# Patient Record
Sex: FEMALE | Race: WHITE
[De-identification: names, ages, dates, MRNs, and addresses within clinical notes are randomized per-mention and may not be internally consistent; named-entity substitution may affect disease eponyms.]

---

## 2018-12-29 ENCOUNTER — HOSPITAL ENCOUNTER (INPATIENT)
Dept: HOSPITAL 14 - H.ER | Age: 63
LOS: 3 days | Discharge: HOME | DRG: 720 | End: 2019-01-01
Attending: GENERAL ACUTE CARE HOSPITAL | Admitting: GENERAL ACUTE CARE HOSPITAL
Payer: MEDICAID

## 2018-12-29 VITALS — BODY MASS INDEX: 26.4 KG/M2

## 2018-12-29 DIAGNOSIS — K42.9: ICD-10-CM

## 2018-12-29 DIAGNOSIS — K52.9: ICD-10-CM

## 2018-12-29 DIAGNOSIS — N28.89: ICD-10-CM

## 2018-12-29 DIAGNOSIS — A41.9: Primary | ICD-10-CM

## 2018-12-29 DIAGNOSIS — K57.30: ICD-10-CM

## 2018-12-29 DIAGNOSIS — Z98.84: ICD-10-CM

## 2018-12-29 DIAGNOSIS — Z87.442: ICD-10-CM

## 2018-12-29 DIAGNOSIS — E11.22: ICD-10-CM

## 2018-12-29 DIAGNOSIS — I12.9: ICD-10-CM

## 2018-12-29 DIAGNOSIS — N13.6: ICD-10-CM

## 2018-12-29 DIAGNOSIS — N18.3: ICD-10-CM

## 2018-12-29 DIAGNOSIS — D50.8: ICD-10-CM

## 2018-12-29 DIAGNOSIS — N28.1: ICD-10-CM

## 2018-12-29 DIAGNOSIS — Z90.49: ICD-10-CM

## 2018-12-29 LAB
ALBUMIN SERPL-MCNC: 4.6 G/DL (ref 3.5–5)
ALBUMIN/GLOB SERPL: 1.1 {RATIO} (ref 1–2.1)
ALT SERPL-CCNC: 14 U/L (ref 9–52)
APTT BLD: 36.3 SECONDS (ref 25.6–37.1)
AST SERPL-CCNC: 21 U/L (ref 14–36)
BACTERIA #/AREA URNS HPF: (no result) /[HPF]
BASE EXCESS BLDV CALC-SCNC: -1.4 MMOL/L (ref 0–2)
BASE EXCESS BLDV CALC-SCNC: -3.9 MMOL/L (ref 0–2)
BASOPHILS # BLD AUTO: 0.1 K/UL (ref 0–0.2)
BASOPHILS NFR BLD: 0.4 % (ref 0–2)
BILIRUB UR-MCNC: NEGATIVE MG/DL
BUN SERPL-MCNC: 16 MG/DL (ref 7–17)
CALCIUM SERPL-MCNC: 10.3 MG/DL (ref 8.4–10.2)
COLOR UR: YELLOW
EOSINOPHIL # BLD AUTO: 0.1 K/UL (ref 0–0.7)
EOSINOPHIL NFR BLD: 0.4 % (ref 0–4)
ERYTHROCYTE [DISTWIDTH] IN BLOOD BY AUTOMATED COUNT: 19.2 % (ref 11.5–14.5)
GFR NON-AFRICAN AMERICAN: > 60
GLUCOSE UR STRIP-MCNC: (no result) MG/DL
HGB BLD-MCNC: 10 G/DL (ref 12–16)
INR PPP: 1
LEUKOCYTE ESTERASE UR-ACNC: (no result) LEU/UL
LIPASE SERPL-CCNC: 100 U/L (ref 23–300)
LYMPHOCYTES # BLD AUTO: 2.2 K/UL (ref 1–4.3)
LYMPHOCYTES NFR BLD AUTO: 14.2 % (ref 20–40)
MCH RBC QN AUTO: 21.9 PG (ref 27–31)
MCHC RBC AUTO-ENTMCNC: 30.4 G/DL (ref 33–37)
MCV RBC AUTO: 72 FL (ref 81–99)
MONOCYTES # BLD: 0.7 K/UL (ref 0–0.8)
MONOCYTES NFR BLD: 4.5 % (ref 0–10)
NEUTROPHILS # BLD: 12.4 K/UL (ref 1.8–7)
NEUTROPHILS NFR BLD AUTO: 80.5 % (ref 50–75)
NRBC BLD AUTO-RTO: 0 % (ref 0–0)
PCO2 BLDV: 28 MMHG (ref 40–60)
PCO2 BLDV: 43 MMHG (ref 40–60)
PH BLDV: 7.32 [PH] (ref 7.32–7.43)
PH BLDV: 7.48 [PH] (ref 7.32–7.43)
PH UR STRIP: 7 [PH] (ref 5–8)
PLATELET # BLD: 661 K/UL (ref 130–400)
PMV BLD AUTO: 8.1 FL (ref 7.2–11.7)
PROT UR STRIP-MCNC: NEGATIVE MG/DL
PROTHROMBIN TIME: 11.4 SECONDS (ref 9.8–13.1)
RBC # BLD AUTO: 4.55 MIL/UL (ref 3.8–5.2)
RBC # UR STRIP: NEGATIVE /UL
SP GR UR STRIP: 1.01 (ref 1–1.03)
SQUAMOUS EPITHIAL: 1 /HPF (ref 0–5)
URINE CLARITY: CLEAR
UROBILINOGEN UR-MCNC: (no result) MG/DL (ref 0.2–1)
VENOUS BLOOD FIO2: 21 %
VENOUS BLOOD FIO2: 21 %
VENOUS BLOOD GAS PO2: 30 MM/HG (ref 30–55)
VENOUS BLOOD GAS PO2: 30 MM/HG (ref 30–55)
WBC # BLD AUTO: 15.4 K/UL (ref 4.8–10.8)

## 2018-12-29 NOTE — CT
Date of service: 



12/29/2018



PROCEDURE:  CT Abdomen and Pelvis with Oral contrast.



HISTORY:

Left abdominal pain vomit, hx gastric bypass



COMPARISON:

None.



TECHNIQUE:

Contiguous axial images of the abdomen and pelvis. Oral contrast was 

administered. No IV contrast given. Coronal and Sagittal reformats 

generated. 



Radiation dose:



Total exam DLP = 565.19 mGy-cm.



This CT exam was performed using one or more of the following dose 

reduction techniques: Automated exposure control, adjustment of the 

mA and/or kV according to patient size, and/or use of iterative 

reconstruction technique.



FINDINGS:



LOWER THORAX:

Heart appears mildly enlarged.  No significant pericardial effusion. 



There is a small hiatal hernia. 



Mild passive atelectasis both posterior lower lung fields.  Small 

localized area of pleural thickening right posterior lower lung field 

may represent postinflammatory sequela. 



LIVER:

Liver is mildly enlarged measuring nearly 19 cm in CC dimension.  

These fatty hepatic infiltration.  No obvious hepatic mass or 

collection.  Small calcification inferior aspect right lobe liver. 



GALLBLADDER AND BILE DUCTS:

Gallbladder is physiologically distended.  No evidence of 

intraluminal gallbladder calculi. 



PANCREAS:

Unremarkable. No mass. No ductal dilatation.



SPLEEN:

Unremarkable. No splenomegaly. 



ADRENALS:

Slight nodular appearing adrenal glands. 



KIDNEYS AND URETERS:

There is an approximately 5.2 mm obstructing calculus in the left 

proximal/mid ureter with left-sided hydronephrosis and 

infiltration/fluid within the perinephric fat of. 



Complex appearing 2.7 x 2.5 cm partially exophytic cyst seen arising 

from the anterior cortex mid/lower pole left kidney..  There is a 

small approximately 4.6 mm low-attenuation focus anteromedial cortex 

lower pole right left kidney that exhibits Hounsfield units in the 

upper 30s that may represent an volume averaging of a hyperdense 

cyst..  A tiny focus upper pole anterior cortex right kidney.  

Follow-up of pre and post-contrast MRI of kidneys recommended for 

further evaluation of these lesions. 



BLADDER:

Urinary bladder is physiologically distended.  No evidence of 

intraluminal urinary bladder calculi..



REPRODUCTIVE:

Unremarkable. 



APPENDIX:

Small radiopaque density seen along the inferomedial aspect of the 

cecum possibly representing changes of prior appendectomy however 

clinical correlation recommended. 



BOWEL:

Evaluation of the bowel is somewhat limited due to incomplete 

opacification.  Postoperative gastric bypass changes.  There is mild 

dilatation of the proximal small bowel however no definitive evidence 

to suggest acute mechanical small bowel obstruction.  Findings could 

represent mild ileus of.  The distal small bowel loops are not 

significantly distended however fluid is present within several of 

these loops; rule out diarrheal illness.  There is mild wall 

thickening of the distal aspect of the transverse colon descending 

and sigmoid colon.  Findings in part may be secondary to incomplete 

distension peristalsis and unopacified stool however mild colitis 

must be considered as well.  Six few scattered colonic diverticula 

seen along the descending and sigmoid colon. 



PERITONEUM:

Unremarkable. No fluid collection. No free air.  Small fat containing 

umbilical hernia. 



LYMPH NODES:

Unremarkable. No enlarged lymph nodes. 



VASCULATURE:

Unremarkable. No aortic aneurysm. Mild aortic atherosclerotic 

calcification or mural plaque present.



BONES:

There are fusion changes of the T12 and L1 segments of.  Mild 

multilevel degenerative spondylosis of the lower thoracic and lumbar 

spine.  There are no acute compression fractures nor retropulsed 

fragments. 



OTHER FINDINGS:

Calcified granulomata both buttocks are present. 



IMPRESSION:

5.2 mm obstructing calculus proximal/mid left ureter with mild 

left-sided hydronephrosis.  Perinephric infiltration and fluid 

present.  Complex appearing partially exophytic left renal cyst.  

Smaller sub cm low-attenuation lesion also present left kidney.  

Follow-up of pre and post-contrast MRI of the kidneys recommended to 

exclude underlying neoplastic process 



Findings discussed with Dr. Maria



Gastric bypass changes. 



Mild dilatation of the proximal small bowel possibly representing 

ileus.  Follow-up plain film radiograph of the abdomen could be 

performed to assess for passage of contrast material into the colon. 



Mild wall thickening of the distal transverse, descending 6 and 

sigmoid colon; rule out mild colitis.  Few scattered colonic 

diverticula 0. 



Fatty infiltration.  Small calcification right lobe liver.  Nodular 

appearing bilateral adrenal glands.

## 2018-12-29 NOTE — ED PDOC
HPI: Abdomen


Time Seen by Provider: 18 10:11


Chief Complaint (Nursing): Abdominal Pain


Chief Complaint (Provider): abdominal pain nausea


History Per: Patient,  (Arnulfo from [facility])


History/Exam Limitations: no limitations


Location Of Pain/Discomfort: LUQ, LLQ


Quality Of Discomfort: Sharp


Associated Symptoms: Nausea, Vomiting (x1), Diarrhea, Loss Of Appetite.  denies:

Back Pain, Chest Pain, Urinary Symptoms


Exacerbating Factors: None


Alleviating Factors: None


Last Bowel Movement: Today


Additional Complaint(s): 





62yo female hx gastric bypass many years ago, presents c/o left sided abdominal 

pain (upper and lower) started last night around 7pm, progressed to nausea and 

one episode vomiting with diarrhea this morning. Pain sharp, no prior history of

same, denies fever, urinary symptoms, hematuria, cough or chest pain.





Past Medical History


Reviewed: Historical Data, Nursing Documentation, Vital Signs


Vital Signs: 





                                Last Vital Signs











Temp  97.9 F   18 10:03


 


Pulse  61   18 10:03


 


Resp  16   18 10:03


 


BP  158/87 H  18 10:03


 


Pulse Ox  100   18 10:03














- Medical History


PMH: 


   Denies: Diabetes (Hx of Dm, resolved s/p Gastric Bypass ), Chronic Kidney

Disease





- Surgical History


Surgical History: Cholecystectomy, 


Other surgeries: gastric bypass





- Family History


Family History: States: Unknown Family Hx





- Living Arrangements


Living Arrangements: With Family





- Social History


Current smoker - smoking cessation education provided: No





- Immunization History


Hx Influenza Vaccination: No





- Home Medications


Home Medications: 


                                Ambulatory Orders











 Medication  Instructions  Recorded


 


Nitrofurantoin Macrocrystals 100 mg PO Q12H #14 cap 10/17/16





[Macrobid]  














- Allergies


Allergies/Adverse Reactions: 


                                    Allergies











Allergy/AdvReac Type Severity Reaction Status Date / Time


 


No Known Allergies Allergy   Verified 10/13/16 09:57














Review of Systems


Constitutional: Negative for: Fever, Chills, Weight loss


Eyes: Negative for: Vision Change


ENT: Negative for: Nose Discharge


Cardiovascular: Negative for: Chest Pain, Palpitations


Respiratory: Negative for: Cough, Shortness of Breath


Gastrointestinal: Positive for: Nausea, Vomiting, Abdominal Pain, Diarrhea.  

Negative for: Constipation, Melena, Hematochezia, Hematemesis


Genitourinary Female: Negative for: Dysuria


Musculoskeletal: Negative for: Neck Pain, Back Pain


Skin: Negative for: Rash, Lesions, Jaundice


Neurological: Positive for: Headache.  Negative for: Weakness, Numbness, 

Dizziness





Physical Exam





- Reviewed


Nursing Documentation Reviewed: Yes


Vital Signs Reviewed: Yes





- Physical Exam


Appears: Positive for: Well, Non-toxic, No Acute Distress


Head Exam: Positive for: ATRAUMATIC, NORMAL INSPECTION, NORMOCEPHALIC


Skin: Positive for: Normal Color, Warm, DRY


Eye Exam: Positive for: EOMI, Normal appearance, PERRL


ENT: Positive for: Normal ENT Inspection


Neck: Positive for: Normal, Painless ROM


Cardiovascular/Chest: Positive for: Regular Rate, Rhythm


Respiratory: Positive for: CNT, Normal Breath Sounds


Gastrointestinal/Abdominal: Positive for: Soft, Tenderness (LUQ and LLQ)


Back: Positive for: Normal Inspection


Extremity: Positive for: Normal ROM


Neurologic/Psych: Positive for: Alert, Oriented





- Laboratory Results


Result Diagrams: 


                                 18 10:45





                                 18 10:45





- ECG


ECG: Positive for: Interpreted By Me


ECG Rhythm: Positive for: Sinus Bradycardia.  Negative for: ST/T Changes


Rate: 58


O2 Sat by Pulse Oximetry: 100


Pulse Ox Interpretation: Normal





- Radiology


X-Ray: Interpreted by Me


X-Ray Interpretation: Other (R elev hemidiaphragm; no infiltrate, no free air)





Medical Decision Making


Medical Decision Making: 





workup for acute abdominal pain with vomiting and diarrhea in setting of prior 

gastric bypass initiated


pain medicine, antiemetic and IVF ordered





labs reviewed revealing elevated WBC and lactate 2.6


Crt normal


UA neg for blood or leuk esterase





repeat 3hr lactate improving











Accession No. : [id number]


Patient Name / ID : TARA MURRAY  / [id number]


Exam Date : 2018 12:01:00 ( Approved )


Study Comment : 


Sex / Age : F  / 63Y





Creator : David Solorio MD


Dictator : David Solorio MD


 : 


Approver : David Solorio MD


Approver2 : 





Report Date : 2018 14:04:34


My Comment : 


***************************************************************

********************





Date of service: 





2018





PROCEDURE:  CT Abdomen and Pelvis with Oral contrast.





HISTORY:


Left abdominal pain vomit, hx gastric bypass





COMPARISON:


None.





TECHNIQUE:


Contiguous axial images of the abdomen and pelvis. Oral contrast was 

administered. No IV contrast given. Coronal and Sagittal reformats generated. 





Radiation dose:





Total exam DLP = 565.19 mGy-cm.





This CT exam was performed using one or more of the following dose reduction 

techniques: Automated exposure control, adjustment of the mA and/or kV according

 to patient size, and/or use of iterative reconstruction technique.





FINDINGS:





LOWER THORAX:


Heart appears mildly enlarged.  No significant pericardial effusion. 





There is a small hiatal hernia. 





Mild passive atelectasis both posterior lower lung fields.  Small localized area

 of pleural thickening right posterior lower lung field may represent 

postinflammatory sequela. 





LIVER:


Liver is mildly enlarged measuring nearly 19 cm in CC dimension.  These fatty 

hepatic infiltration.  No obvious hepatic mass or collection.  Small 

calcification inferior aspect right lobe liver. 





GALLBLADDER AND BILE DUCTS:


Gallbladder is physiologically distended.  No evidence of intraluminal 

gallbladder calculi. 





PANCREAS:


Unremarkable. No mass. No ductal dilatation.





SPLEEN:


Unremarkable. No splenomegaly. 





ADRENALS:


Slight nodular appearing adrenal glands. 





KIDNEYS AND URETERS:


There is an approximately 5.2 mm obstructing calculus in the left proximal/mid 

ureter with left-sided hydronephrosis and infiltration/fluid within the 

perinephric fat of. 





Complex appearing 2.7 x 2.5 cm partially exophytic cyst seen arising from the 

anterior cortex mid/lower pole left kidney..  There is a small approximately 4.6

 mm low-attenuation focus anteromedial cortex lower pole right left kidney that 

exhibits Hounsfield units in the upper 30s that may represent an volume 

averaging of a hyperdense cyst..  A tiny focus upper pole anterior cortex right 

kidney.  Follow-up of pre and post-contrast MRI of kidneys recommended for 

further evaluation of these lesions. 





BLADDER:


Urinary bladder is physiologically distended.  No evidence of intraluminal 

urinary bladder calculi..





REPRODUCTIVE:


Unremarkable. 





APPENDIX:


Small radiopaque density seen along the inferomedial aspect of the cecum 

possibly representing changes of prior appendectomy however clinical correlation

 recommended. 





BOWEL:


Evaluation of the bowel is somewhat limited due to incomplete opacification.  

Postoperative gastric bypass changes.  There is mild dilatation of the proximal 

small bowel however no definitive evidence to suggest acute mechanical small 

bowel obstruction.  Findings could represent mild ileus of.  The distal small 

bowel loops are not significantly distended however fluid is present within 

several of these loops; rule out diarrheal illness.  There is mild wall thick

ening of the distal aspect of the transverse colon descending and sigmoid colon.

  Findings in part may be secondary to incomplete distension peristalsis and 

unopacified stool however mild colitis must be considered as well.  Six few 

scattered colonic diverticula seen along the descending and sigmoid colon. 





PERITONEUM:


Unremarkable. No fluid collection. No free air.  Small fat containing umbilical 

hernia. 





LYMPH NODES:


Unremarkable. No enlarged lymph nodes. 





VASCULATURE:


Unremarkable. No aortic aneurysm. Mild aortic atherosclerotic calcification or 

mural plaque present.





BONES:


There are fusion changes of the T12 and L1 segments of.  Mild multilevel 

degenerative spondylosis of the lower thoracic and lumbar spine.  There are no 

acute compression fractures nor retropulsed fragments. 





OTHER FINDINGS:


Calcified granulomata both buttocks are present. 





IMPRESSION:


5.2 mm obstructing calculus proximal/mid left ureter with mild left-sided 

hydronephrosis.  Perinephric infiltration and fluid present.  Complex appearing 

partially exophytic left renal cyst.  Smaller sub cm low-attenuation lesion also

 present left kidney.  Follow-up of pre and post-contrast MRI of the kidneys 

recommended to exclude underlying neoplastic process 





Findings discussed with Dr. Maria





Gastric bypass changes. 





Mild dilatation of the proximal small bowel possibly representing ileus.  

Follow-up plain film radiograph of the abdomen could be performed to assess for 

passage of contrast material into the colon. 





Mild wall thickening of the distal transverse, descending 6 and sigmoid colon; 

rule out mild colitis.  Few scattered colonic diverticula 0. 





Fatty infiltration.  Small calcification right lobe liver.  Nodular appearing 

bilateral adrenal glands.








-------------------------------------





additional analgesia required. 


flomax ordered





rocephin ordered after blood cultures obtained





Dr Larose urology on call contacted and aware 220pm





Dr Tran hospitalist contacted 230p care transferred

















Disposition





- Clinical Impression


Clinical Impression: 


 Severe sepsis, Nephrolithiasis, Renal mass








- Patient ED Disposition


Is Patient to be Admitted: Yes


Counseled Patient/Family Regarding: Studies Performed, Diagnosis, Need For 

Followup





- Disposition


Disposition Time: 13:30


Condition: STABLE

## 2018-12-29 NOTE — CP.PCM.HP
<Jose A Aldrich - Last Filed: 12/29/18 16:51>





History of Present Illness





- History of Present Illness


History of Present Illness: 





62 y/o F presented to ED complaining of sudden severe abdominal pain that began 

this morning. Pain is described as sharp/dull, from L flank area radiates to sup

rapubic area, constant, moderately improved with administered medications in ER.

Pt reports associated chills, 2 episodes of non-bloody non-mucous diarrhea since

this morning. No recent trauma. No recent travel. Pt denies sweating, Hx of 

nephrolithiasis, dysuria, urinary frequency, hematuria, rash, vaginal pruritus, 

discharge, nausea, vomiting or constipation. 





NKDA


Meds: none





PMHx: DM2 but controlled after gastric bypass surgery


PSHx: Gastric bypass surgery, uterine fibroid removal, 2x C-Sections, 

Appendectomy.  


FHx: NC


SHx: Never smoker, no alcohol and no rec drugs. 





At ED: 


--Vitals signs stable. No fever.


--CBC showed WBC of 15.4, Hgb 10, Plt 661K


--CMP was unremarkable; Lipase WNL; troponin negative; U/A unremarkable. 


--CT Abdomen/pelvis: 5.2mm obstructing calculus proximal/mid L ureter with mild 

L hydronephrosis. 


--1x dose of Ceftriaxone and pain management administered.








Present on Admission





- Present on Admission


Any Indicators Present on Admission: No





Review of Systems





- Constitutional


Constitutional: Chills.  absent: Fever, Weight Loss





- EENT


Nose/Mouth/Throat: absent: Dysphagia, Mouth Pain, Sore Throat, Facial Pain, Neck

Mass





- Cardiovascular


Cardiovascular: absent: Chest Pain, Chest Pain at Rest, Dyspnea, Edema





- Respiratory


Respiratory: absent: Cough, Dyspnea, Hemoptysis





- Gastrointestinal


Gastrointestinal: Abdominal Pain.  absent: Hematemesis, Nausea, Vomiting





- Genitourinary


Genitourinary: Flank Pain.  absent: Difficulty Urinating, Dysuria, Hematuria, 

Urinary Frequency, Freq UTI





Past Patient History





- Past Medical History & Family History


Past Medical History?: Yes





- Past Social History


Smoking Status: Never Smoked





- CARDIAC


Hx Hypertension: No (Hx of HTN, resolved s/p Gastric Bypass 2012)





- PULMONARY


Hx Respiratory Disorders: No





- NEUROLOGICAL


Hx Neurological Disorder: No





- HEENT


Hx HEENT Problems: No





- RENAL


Hx Chronic Kidney Disease: No





- ENDOCRINE/METABOLIC


Hx Endocrine Disorders: No





- HEMATOLOGICAL/ONCOLOGICAL


Hx Blood Disorders: No





- INTEGUMENTARY


Hx Dermatological Problems: No





- MUSCULOSKELETAL/RHEUMATOLOGICAL


Hx Musculoskeletal Disorders: Yes


Hx Falls: Yes





- GASTROINTESTINAL


Hx Gastrointestinal Disorders: Yes


Other/Comment: HX GASTRIC BYPASS 2012





- GENITOURINARY/GYNECOLOGICAL


Hx Genitourinary Disorders: No





- PSYCHIATRIC


Hx Psychophysiologic Disorder: No


Hx Substance Use: No





- SURGICAL HISTORY


Hx Cholecystectomy: Yes





- ANESTHESIA


Hx Anesthesia: Yes


Hx Anesthesia Reactions: No


Hx Malignant Hyperthermia: No





Meds


Allergies/Adverse Reactions: 


                                    Allergies











Allergy/AdvReac Type Severity Reaction Status Date / Time


 


No Known Allergies Allergy   Verified 10/13/16 09:57














Physical Exam





- Constitutional


Appears: No Acute Distress





- Head Exam


Head Exam: ATRAUMATIC, NORMAL INSPECTION, NORMOCEPHALIC





- Eye Exam


Eye Exam: EOMI, Normal appearance





- ENT Exam


ENT Exam: Mucous Membranes Moist





- Neck Exam


Neck exam: Positive for: Full Rom, Normal Inspection.  Negative for: Meningismus





- Respiratory Exam


Respiratory Exam: NORMAL BREATHING PATTERN.  absent: Rhonchi, Wheezes, 

Respiratory Distress





- Cardiovascular Exam


Cardiovascular Exam: REGULAR RHYTHM, +S1, +S2





- GI/Abdominal Exam


GI & Abdominal Exam: Normal Bowel Sounds, Soft, Tenderness (suprapubic and LLQ 

areas. ).  absent: Distended, Guarding, Rebound, Rigid





- Extremities Exam


Extremities exam: Positive for: full ROM.  Negative for: calf tenderness, pedal 

edema





- Back Exam


Back exam: CVA tenderness (L).  absent: CVA tenderness (R), paraspinal 

tenderness





- Neurological Exam


Neurological exam: Alert, Oriented x3





Results





- Vital Signs


Recent Vital Signs: 





                                Last Vital Signs











Temp  98.8 F   12/29/18 13:48


 


Pulse  58 L  12/29/18 14:36


 


Resp  16   12/29/18 13:20


 


BP  147/78   12/29/18 13:20


 


Pulse Ox  100   12/29/18 14:36














- Labs


Result Diagrams: 


                                 12/29/18 10:45





                                 12/29/18 10:45


Labs: 





                         Laboratory Results - last 24 hr











  12/29/18 12/29/18 12/29/18





  10:45 10:45 10:45


 


WBC  15.4 H  


 


RBC  4.55  


 


Hgb  10.0 L  


 


Hct  32.8 L  


 


MCV  72.0 L  


 


MCH  21.9 L  


 


MCHC  30.4 L  


 


RDW  19.2 H  


 


Plt Count  661 H  


 


MPV  8.1  


 


Neut % (Auto)  80.5 H  


 


Lymph % (Auto)  14.2 L  


 


Mono % (Auto)  4.5  


 


Eos % (Auto)  0.4  


 


Baso % (Auto)  0.4  


 


Neut # (Auto)  12.4 H  


 


Lymph # (Auto)  2.2  


 


Mono # (Auto)  0.7  


 


Eos # (Auto)  0.1  


 


Baso # (Auto)  0.1  


 


PT    11.4


 


INR    1.0


 


APTT    36.3


 


pO2   


 


VBG pH   


 


VBG pCO2   


 


VBG HCO3   


 


VBG Total CO2   


 


VBG O2 Sat (Calc)   


 


VBG Base Excess   


 


VBG Potassium   


 


Glucose   


 


Lactate   


 


FiO2   


 


Sodium   138 


 


Potassium   3.7 


 


Chloride   105 


 


Carbon Dioxide   20 L 


 


Anion Gap   17 


 


BUN   16 


 


Creatinine   0.6 L 


 


Est GFR ( Amer)   > 60 


 


Est GFR (Non-Af Amer)   > 60 


 


Random Glucose   187 H 


 


Calcium   10.3 H 


 


Total Bilirubin   0.2 


 


AST   21 


 


ALT   14 


 


Alkaline Phosphatase   152 H 


 


Troponin I   < 0.0120 


 


Total Protein   8.8 H 


 


Albumin   4.6 


 


Globulin   4.2 H 


 


Albumin/Globulin Ratio   1.1 


 


Lipase   100 


 


Venous Blood Potassium   


 


Urine Color   


 


Urine Clarity   


 


Urine pH   


 


Ur Specific Gravity   


 


Urine Protein   


 


Urine Glucose (UA)   


 


Urine Ketones   


 


Urine Blood   


 


Urine Nitrate   


 


Urine Bilirubin   


 


Urine Urobilinogen   


 


Ur Leukocyte Esterase   


 


Urine RBC (Auto)   


 


Urine Microscopic WBC   


 


Ur Squamous Epith Cells   


 


Urine Bacteria   














  12/29/18 12/29/18 12/29/18





  11:00 12:00 13:50


 


WBC   


 


RBC   


 


Hgb   


 


Hct   


 


MCV   


 


MCH   


 


MCHC   


 


RDW   


 


Plt Count   


 


MPV   


 


Neut % (Auto)   


 


Lymph % (Auto)   


 


Mono % (Auto)   


 


Eos % (Auto)   


 


Baso % (Auto)   


 


Neut # (Auto)   


 


Lymph # (Auto)   


 


Mono # (Auto)   


 


Eos # (Auto)   


 


Baso # (Auto)   


 


PT   


 


INR   


 


APTT   


 


pO2  30   30


 


VBG pH  7.48 H   7.32


 


VBG pCO2  28 L   43


 


VBG HCO3  22.9   20.6


 


VBG Total CO2  21.8 L   23.5


 


VBG O2 Sat (Calc)  61.7   52.1


 


VBG Base Excess  -1.4 L   -3.9 L


 


VBG Potassium  3.5 L   3.6


 


Glucose  187 H   193 H


 


Lactate  2.7 H   2.0


 


FiO2  21.0   21.0


 


Sodium  136.0   132.0


 


Potassium   


 


Chloride  106.0   103.0


 


Carbon Dioxide   


 


Anion Gap   


 


BUN   


 


Creatinine   


 


Est GFR ( Amer)   


 


Est GFR (Non-Af Amer)   


 


Random Glucose   


 


Calcium   


 


Total Bilirubin   


 


AST   


 


ALT   


 


Alkaline Phosphatase   


 


Troponin I   


 


Total Protein   


 


Albumin   


 


Globulin   


 


Albumin/Globulin Ratio   


 


Lipase   


 


Venous Blood Potassium  3.5 L   3.6


 


Urine Color   Yellow 


 


Urine Clarity   Clear 


 


Urine pH   7.0 


 


Ur Specific Gravity   1.014 


 


Urine Protein   Negative 


 


Urine Glucose (UA)   Neg 


 


Urine Ketones   20 


 


Urine Blood   Negative 


 


Urine Nitrate   Negative 


 


Urine Bilirubin   Negative 


 


Urine Urobilinogen   0.2-1.0 


 


Ur Leukocyte Esterase   Neg 


 


Urine RBC (Auto)   < 1 


 


Urine Microscopic WBC   1 


 


Ur Squamous Epith Cells   1 


 


Urine Bacteria   Rare 














Assessment & Plan





- Assessment and Plan (Free Text)


Assessment: 





62 y/o F presented to ED complaining of severe L abdominal and suprapubic pain, 

admitted for evaluation and management of nephrolithiasis. 


--CT Abdomen/pelvis: 5.2mm obstructing calculus proximal/mid L ureter with mild 

L hydronephrosis; and mild colitis.  





PLAN:





>Nephrolithiasis


--Afebrile, stable.


--CT Abdomen/pelvis: 5.2mm obstructing calculus proximal/mid L ureter with mild 

L hydronephrosis. 


--Pain management ordered.


--Zofran PRN for nausea and vomiting. 


--Ceftriaxone IV daily. 


--Flomax daily


--Urology consult, Dr Larose.





>Colitis, mild


--CT Abdomen/pelvis: 5.2mm obstructing calculus proximal/mid L ureter with mild 

L hydronephrosis; and mild colitis.  


--No more diarrhea since early this morning. 


--Zofran PRN for nausea and vomiting. 


--Monitor symptoms





>DVT Prophylaxis


--SCDs


--Lovenox 40mg daily





>Diet


--Clear liquid








<Tran,Reagan D - Last Filed: 12/29/18 19:19>





Results





- Vital Signs


Recent Vital Signs: 





                                Last Vital Signs











Temp  98.2 F   12/29/18 19:10


 


Pulse  90   12/29/18 19:10


 


Resp  18   12/29/18 19:10


 


BP  92/57 L  12/29/18 19:10


 


Pulse Ox  95   12/29/18 19:10














- Labs


Result Diagrams: 


                                 12/29/18 10:45





                                 12/29/18 10:45


Labs: 





                         Laboratory Results - last 24 hr











  12/29/18 12/29/18 12/29/18





  10:45 10:45 10:45


 


WBC  15.4 H  


 


RBC  4.55  


 


Hgb  10.0 L  


 


Hct  32.8 L  


 


MCV  72.0 L  


 


MCH  21.9 L  


 


MCHC  30.4 L  


 


RDW  19.2 H  


 


Plt Count  661 H  


 


MPV  8.1  


 


Neut % (Auto)  80.5 H  


 


Lymph % (Auto)  14.2 L  


 


Mono % (Auto)  4.5  


 


Eos % (Auto)  0.4  


 


Baso % (Auto)  0.4  


 


Neut # (Auto)  12.4 H  


 


Lymph # (Auto)  2.2  


 


Mono # (Auto)  0.7  


 


Eos # (Auto)  0.1  


 


Baso # (Auto)  0.1  


 


PT    11.4


 


INR    1.0


 


APTT    36.3


 


pO2   


 


VBG pH   


 


VBG pCO2   


 


VBG HCO3   


 


VBG Total CO2   


 


VBG O2 Sat (Calc)   


 


VBG Base Excess   


 


VBG Potassium   


 


Glucose   


 


Lactate   


 


FiO2   


 


Sodium   138 


 


Potassium   3.7 


 


Chloride   105 


 


Carbon Dioxide   20 L 


 


Anion Gap   17 


 


BUN   16 


 


Creatinine   0.6 L 


 


Est GFR ( Amer)   > 60 


 


Est GFR (Non-Af Amer)   > 60 


 


Random Glucose   187 H 


 


Calcium   10.3 H 


 


Total Bilirubin   0.2 


 


AST   21 


 


ALT   14 


 


Alkaline Phosphatase   152 H 


 


Troponin I   < 0.0120 


 


Total Protein   8.8 H 


 


Albumin   4.6 


 


Globulin   4.2 H 


 


Albumin/Globulin Ratio   1.1 


 


Lipase   100 


 


Venous Blood Potassium   


 


Urine Color   


 


Urine Clarity   


 


Urine pH   


 


Ur Specific Gravity   


 


Urine Protein   


 


Urine Glucose (UA)   


 


Urine Ketones   


 


Urine Blood   


 


Urine Nitrate   


 


Urine Bilirubin   


 


Urine Urobilinogen   


 


Ur Leukocyte Esterase   


 


Urine RBC (Auto)   


 


Urine Microscopic WBC   


 


Ur Squamous Epith Cells   


 


Urine Bacteria   














  12/29/18 12/29/18 12/29/18





  11:00 12:00 13:50


 


WBC   


 


RBC   


 


Hgb   


 


Hct   


 


MCV   


 


MCH   


 


MCHC   


 


RDW   


 


Plt Count   


 


MPV   


 


Neut % (Auto)   


 


Lymph % (Auto)   


 


Mono % (Auto)   


 


Eos % (Auto)   


 


Baso % (Auto)   


 


Neut # (Auto)   


 


Lymph # (Auto)   


 


Mono # (Auto)   


 


Eos # (Auto)   


 


Baso # (Auto)   


 


PT   


 


INR   


 


APTT   


 


pO2  30   30


 


VBG pH  7.48 H   7.32


 


VBG pCO2  28 L   43


 


VBG HCO3  22.9   20.6


 


VBG Total CO2  21.8 L   23.5


 


VBG O2 Sat (Calc)  61.7   52.1


 


VBG Base Excess  -1.4 L   -3.9 L


 


VBG Potassium  3.5 L   3.6


 


Glucose  187 H   193 H


 


Lactate  2.7 H   2.0


 


FiO2  21.0   21.0


 


Sodium  136.0   132.0


 


Potassium   


 


Chloride  106.0   103.0


 


Carbon Dioxide   


 


Anion Gap   


 


BUN   


 


Creatinine   


 


Est GFR ( Amer)   


 


Est GFR (Non-Af Amer)   


 


Random Glucose   


 


Calcium   


 


Total Bilirubin   


 


AST   


 


ALT   


 


Alkaline Phosphatase   


 


Troponin I   


 


Total Protein   


 


Albumin   


 


Globulin   


 


Albumin/Globulin Ratio   


 


Lipase   


 


Venous Blood Potassium  3.5 L   3.6


 


Urine Color   Yellow 


 


Urine Clarity   Clear 


 


Urine pH   7.0 


 


Ur Specific Gravity   1.014 


 


Urine Protein   Negative 


 


Urine Glucose (UA)   Neg 


 


Urine Ketones   20 


 


Urine Blood   Negative 


 


Urine Nitrate   Negative 


 


Urine Bilirubin   Negative 


 


Urine Urobilinogen   0.2-1.0 


 


Ur Leukocyte Esterase   Neg 


 


Urine RBC (Auto)   < 1 


 


Urine Microscopic WBC   1 


 


Ur Squamous Epith Cells   1 


 


Urine Bacteria   Rare 














Attending/Attestation





- Attestation


I have personally seen and examined this patient.: Yes


I have fully participated in the care of the patient.: Yes


I have reviewed all pertinent clinical information: Yes


Notes (Text): 





12/29/18 19:19


Patient seen and examined with resident. Case discussed and agreed with 

assessment and plan of management

## 2018-12-29 NOTE — RAD
Date of service: 



12/29/2018



HISTORY:

 SOB 



COMPARISON:

Comparison chest dated 03/03/2009. 



FINDINGS:



LUNGS:

Poor inspiration with low lung volumes, crowded bronchovascular 

markings and minor bibasilar atelectasis..



PLEURA:

No significant pleural effusion identified, no pneumothorax apparent.



CARDIOVASCULAR:

No aortic atherosclerotic calcification present.



Heart size upper limits of normal..  No pulmonary vascular 

congestion. 



OSSEOUS STRUCTURES:

No significant abnormalities.



VISUALIZED UPPER ABDOMEN:

Normal.



OTHER FINDINGS:

None.



IMPRESSION:

Poor inspiration with low lung volumes, crowded bronchovascular 

markings and minor bibasilar atelectasis..

## 2018-12-30 LAB
% IRON SATURATION: 2 % (ref 20–55)
ALBUMIN SERPL-MCNC: 3.1 G/DL (ref 3.5–5)
ALBUMIN/GLOB SERPL: 1 {RATIO} (ref 1–2.1)
ALT SERPL-CCNC: 28 U/L (ref 9–52)
AST SERPL-CCNC: 30 U/L (ref 14–36)
BUN SERPL-MCNC: 15 MG/DL (ref 7–17)
CALCIUM SERPL-MCNC: 8.9 MG/DL (ref 8.4–10.2)
ERYTHROCYTE [DISTWIDTH] IN BLOOD BY AUTOMATED COUNT: 19.3 % (ref 11.5–14.5)
FERRITIN SERPL-MCNC: 13.7 NG/ML (ref 11.1–264)
GFR NON-AFRICAN AMERICAN: 50
HGB BLD-MCNC: 7.9 G/DL (ref 12–16)
IRON SERPL-MCNC: 10 UG/DL (ref 37–170)
MCH RBC QN AUTO: 22.5 PG (ref 27–31)
MCHC RBC AUTO-ENTMCNC: 31.3 G/DL (ref 33–37)
MCV RBC AUTO: 71.9 FL (ref 81–99)
PLATELET # BLD: 451 K/UL (ref 130–400)
RBC # BLD AUTO: 3.52 MIL/UL (ref 3.8–5.2)
TIBC SERPL-MCNC: 416 UG/DL (ref 250–450)
VIT B12 SERPL-MCNC: 673 PG/ML (ref 239–931)
WBC # BLD AUTO: 19.8 K/UL (ref 4.8–10.8)

## 2018-12-30 NOTE — CP.PCM.PN
<Kelvin Angel - Last Filed: 12/30/18 16:41>





Subjective





- Date & Time of Evaluation


Date of Evaluation: 12/30/18


Time of Evaluation: 09:50





- Subjective


Subjective: 


Patient seen and evaluated at bedside in AM. No acute events overnight. Alert, 

awake, NAD. Left abdominal pain significantly improved compare to yesterday and 

well controlled with pain medications. Nausea resolved today. Able to tolerate 

diet well. Patient able to ambulate and go to bathroom W/O difficulties. 

Urinating well. Denies any blood in urine, dysuria, fever, chills, back pain. 








Objective





- Vital Signs/Intake and Output


Vital Signs (last 24 hours): 


                                        











Temp Pulse Resp BP Pulse Ox


 


 98 F   79   18   101/56 L  96 


 


 12/30/18 12:16  12/30/18 12:16  12/30/18 12:16  12/30/18 12:16  12/30/18 12:16











- Medications


Medications: 


                               Current Medications





Acetaminophen (Tylenol 325mg Tab)  650 mg PO Q6 PRN


   PRN Reason: Pain, Mild (1-3)


Enoxaparin Sodium (Lovenox)  40 mg SC DAILY [facility]AN; Protocol


   Last Admin: 12/30/18 11:46 Dose:  40 mg


Ferrous Sulfate (Feosol)  325 mg PO BID EHTAN


   Last Admin: 12/30/18 08:27 Dose:  325 mg


Sodium Chloride (Sodium Chloride 0.9%)  1,000 mls @ 1,000 mls/hr IV .Q1H ETHAN


   Stop: 12/30/18 14:16


   Last Admin: 12/29/18 14:30 Dose:  1,000 mls/hr


Ceftriaxone Sodium 1 gm/ (Sodium Chloride)  100 mls @ 100 mls/hr IVPB DAILY [facility]AN;

Protocol


   Last Admin: 12/30/18 08:30 Dose:  100 mls/hr


Sodium Chloride (Sodium Chloride 0.9%)  1,000 mls @ 150 mls/hr IV .Q6H40M ETHAN


   Stop: 12/30/18 15:02


   Last Admin: 12/30/18 00:29 Dose:  150 mls/hr


Sodium Chloride (Sodium Chloride 0.9%)  500 mls @ 999 mls/hr IV .Q31M ETHAN


   Last Admin: 12/29/18 21:35 Dose:  999 mls/hr


Ibuprofen (Motrin Tab)  400 mg PO Q6 PRN


   PRN Reason: Fever >100.4 F


   Last Admin: 12/29/18 17:08 Dose:  400 mg


Ketorolac Tromethamine (Toradol)  30 mg IVP Q6 PRN


   PRN Reason: Pain, severe (8-10)


   Last Admin: 12/30/18 08:27 Dose:  30 mg


Ketorolac Tromethamine (Toradol)  15 mg IVP Q6 PRN


   PRN Reason: Pain, moderate (4-7)


Ondansetron HCl (Zofran Inj)  4 mg IVP Q6 PRN


   PRN Reason: Nausea/Vomiting


Tamsulosin HCl (Flomax)  0.4 mg PO DAILY ETHAN


   Last Admin: 12/30/18 08:29 Dose:  0.4 mg











- Labs


Labs: 


                                        





                                 12/30/18 05:30 





                                 12/30/18 05:30 





                                        











PT  11.4 Seconds (9.8-13.1)   12/29/18  10:45    


 


INR  1.0   12/29/18  10:45    


 


APTT  36.3 Seconds (25.6-37.1)   12/29/18  10:45    














- Constitutional


Appears: No Acute Distress





- Head Exam


Head Exam: ATRAUMATIC, NORMOCEPHALIC





- Eye Exam


Eye Exam: EOMI, Normal appearance





- ENT Exam


ENT Exam: Mucous Membranes Moist





- Respiratory Exam


Respiratory Exam: Clear to Ausculation Bilateral, NORMAL BREATHING PATTERN.  

absent: Rales, Rhonchi, Wheezes, Respiratory Distress





- Cardiovascular Exam


Cardiovascular Exam: REGULAR RHYTHM, +S1, +S2.  absent: Tachycardia, Murmur





- GI/Abdominal Exam


GI & Abdominal Exam: Soft.  absent: Distended, Guarding, Rigid


Additional comments: 


LLQ tenderness +








- Back Exam


Back Exam: absent: CVA tenderness (L), CVA tenderness (R)





- Neurological Exam


Neurological Exam: Alert, Awake, Oriented x3





- Psychiatric Exam


Psychiatric exam: Normal Affect, Normal Mood





- Skin


Skin Exam: Dry, Intact, Normal Color, Warm





Assessment and Plan





- Assessment and Plan (Free Text)


Assessment: 


64 y/o F presented to ED complaining of severe L abdominal and suprapubic pain, 

admitted for evaluation and management of nephrolithiasis. 


-CT Abdomen/pelvis: 5.2mm obstructing calculus proximal/mid L ureter with mild L

hydronephrosis; and mild colitis.  


Plan: 


Nephrolithiasis


-Afebrile, stable.


-CT Abdomen/pelvis: 5.2mm obstructing calculus proximal/mid L ureter with mild L

hydronephrosis. 


-Pain management ordered.


-Zofran PRN for nausea and vomiting. 


-Ceftriaxone IV daily. 


-Flomax daily


-Urology consult, Dr Larose. Patient scheduled for stent placement tomorrow.

Patient to remain NPO past midnight.





Colitis, mild


-CT Abdomen/pelvis: 5.2mm obstructing calculus proximal/mid L ureter with mild L

hydronephrosis; and mild colitis.  


-Zofran PRN for nausea and vomiting. 


-Monitor symptoms





Iron deficiency anemia


- Asymptomatic


- Likely secondary to gastric bypass or decreased PO intake.


- CBC H/H 7.9/25.3, MCV 71.9, Retic count 1.8, Iron 10, Ferretin 13.3, TIBC 416,

B12 600+


- Start Feosol 325 BID


- Monitor CBC, F/U Folate





DVT Prophylaxis


-SCDs


-Lovenox 40mg daily





Diet


-Clear liquid, NPO post midnight











<Reagan Tran D - Last Filed: 12/30/18 17:02>





Objective





- Vital Signs/Intake and Output


Vital Signs (last 24 hours): 


                                        











Temp Pulse Resp BP Pulse Ox


 


 97.8 F   77   18   119/68   95 


 


 12/30/18 16:33  12/30/18 16:33  12/30/18 16:33  12/30/18 16:33  12/30/18 16:33











- Medications


Medications: 


                               Current Medications





Acetaminophen (Tylenol 325mg Tab)  650 mg PO Q6 PRN


   PRN Reason: Pain, Mild (1-3)


Enoxaparin Sodium (Lovenox)  40 mg SC DAILY [facility]; Protocol


   Last Admin: 12/30/18 11:46 Dose:  40 mg


Ferrous Sulfate (Feosol)  325 mg PO BID [facility]


   Last Admin: 12/30/18 08:27 Dose:  325 mg


Ceftriaxone Sodium 1 gm/ (Sodium Chloride)  100 mls @ 100 mls/hr IVPB DAILY [facility];

Protocol


   Last Admin: 12/30/18 08:30 Dose:  100 mls/hr


Sodium Chloride (Sodium Chloride 0.9%)  500 mls @ 999 mls/hr IV .Q31M ETHAN


   Last Admin: 12/29/18 21:35 Dose:  999 mls/hr


Ibuprofen (Motrin Tab)  400 mg PO Q6 PRN


   PRN Reason: Fever >100.4 F


   Last Admin: 12/29/18 17:08 Dose:  400 mg


Ketorolac Tromethamine (Toradol)  30 mg IVP Q6 PRN


   PRN Reason: Pain, severe (8-10)


   Last Admin: 12/30/18 08:27 Dose:  30 mg


Ketorolac Tromethamine (Toradol)  15 mg IVP Q6 PRN


   PRN Reason: Pain, moderate (4-7)


Metronidazole (Flagyl)  500 mg PO Q8 [facility]AN; Protocol


Ondansetron HCl (Zofran Inj)  4 mg IVP Q6 PRN


   PRN Reason: Nausea/Vomiting


Tamsulosin HCl (Flomax)  0.4 mg PO DAILY ETHAN


   Last Admin: 12/30/18 08:29 Dose:  0.4 mg











- Labs


Labs: 


                                        





                                 12/30/18 05:30 





                                 12/30/18 05:30 





                                        











PT  11.4 Seconds (9.8-13.1)   12/29/18  10:45    


 


INR  1.0   12/29/18  10:45    


 


APTT  36.3 Seconds (25.6-37.1)   12/29/18  10:45    














Attending/Attestation





- Attestation


I have personally seen and examined this patient.: Yes


I have fully participated in the care of the patient.: Yes


I have reviewed all pertinent clinical information, including history, physical 

exam and plan: Yes


Notes (Text): 





12/30/18 16:56


Patient seen and examined with resident. Case discussed and agreed with 

assessment. Patient for cystoscopy tomorrow with Dr Larose for obstructing 

left ureterolithiasis. Patient is medically cleared as low risk for the 

procedure. Type and cross ordered for 2 units just in case patient may need 

them.

## 2018-12-30 NOTE — CON
DATE:  12/30/2018



TIME OF CONSULTATION:  Roughly 3:10 p.m.



BRIEF HISTORY:  The patient is a 63-year-old  female from [facility] beds at the [facility]

who presents to [facility] ER with a complaint of

abdominal pain and renal colic associated with some diarrhea.  The patient

has a prior history of kidney stones diagnosed 2 years ago with no passage

of any stones and no treatment for kidney stones.  She currently voids with

a usual normal urinary stream.  She is status post gastric bypass procedure

more than 10 years ago.  Additional surgeries include 2 C-sections and an

appendectomy.  Abdominopelvic CT with p.o. and IV contrast done on

12/29/2018 showed a 5.2 mm obstructing calculus in the left proximal/mid

ureter with left-sided hydronephrosis and infiltration/fluid within the

perinephric fat.  She also has a complex-appearing 2.7 x 2.5 cm partially

exophytic cyst seen arising from the anterior cortex mid lower pole left

kidney.  There is a small approximately 4.6 mm low-attenuation focus

anteromedial cortex, lower pole, right kidney that exhibits Hounsfield

units in the upper 30s that may represent a volume averaging hyperdense

cyst.  A tiny focus upper pole anterior cortex of the right kidney. 

Followup pre- and post-contrast MRI of the kidneys recommended for further

evaluation of these lesions.  Urinary bladder was physiologically distended

and no evidence of any intraluminal urinary bladder calculi.  Peritoneum

was unremarkable.  No fluid collection.  No free air.  Fat-containing

umbilical hernia.  Lymph nodes were unremarkable.  No enlarged lymph nodes.

Vasculature was unremarkable.  No aortic aneurysm, mild aortic

atherosclerotic calcification or mural plaque present.  The bowel

evaluation is somewhat limited due to incomplete opacification. 

Postoperative gastric bypass changes.  Mild dilatation of the proximal

small bowel, however, no definite evidence to suggest acute mechanical

small bowel obstruction.  Findings could represent mild ileus.  Distal

small bowel loops are not significantly distended; however, fluids present

within several of these loops, rule out diarrheal illness, which the

patient does have.  There was mild wall thickening of the distal aspect of

the transverse colon and descending colon and sigmoid colon.  Findings in

part may be secondary to incomplete distention, peristalsis, and

unopacified stool; however, mild colitis should be considered as well.  Few

scattered colonic diverticula seen along the descending and sigmoid colon. 

Urologic diagnostic impression for this.  The patient currently is still

complaining of left renal colic and mid lower left abdominal pain.



PAST MEDICAL HISTORY:  The patient's past medical history includes diabetes

mellitus, diagnosed prior to her gastric bypass, and she has so far no

diabetes post gastric bypass.  She has no other medical history and is

currently on no medications at home.



SOCIAL HISTORY:  She is a nonsmoker and no history of any alcohol use.



PHYSICAL EXAMINATION:  Today,

VITAL SIGNS:  Today, 12/30/2018, shows a temperature of 98.  Pulse rate 79.

Blood pressure 101/56.  Respiratory rate 18.  O2 saturation on room air

96%.

GENERAL:  She is a well-developed, well-nourished, obese  female. 

She is alert, oriented.

HEENT:  Examination grossly within normal limits.

NECK:  Supple.  Thyroid not palpable.

EXTREMITIES:  She has full range of motion of both upper and lower

extremities.  No leg edema or calf tenderness present.



LABORATORY EVALUATION:  Her CBC shows a rising WBC count now 19.8, up from

15.4 on admission.  Her hemoglobin is 7.9, hematocrit 25.3 indicating a

severe anemia and her platelet count is 451,000 indicating thrombocytosis. 

Her admission platelet count was 661,000.  Her reticulocyte count is 1.8. 

Her chem profile today, 12/30/2018, shows a sodium of 138.  Potassium 4.1. 

Chloride 109.  CO2 of 22.  BUN and creatinine 15 and 1.1 respectively with

a GFR of 50 indicating chronic kidney disease, stage III.  Random glucose

was 113.  Calcium 8.9.  Total bilirubin 0.1.  AST 30.  ALT 28.  Alkaline

phosphatase 79.  Total protein 6.3.  Urinalysis on admission, 12/29/2018,

showed the color was yellow, clarity was clear.  PH 7.  Specific gravity

1.014.  Protein negative.  Glucose negative.  Ketones 20.  Blood, nitrite,

bilirubin all negative.  Urobilinogen is 0.2 to 1.  Leukocyte esterase

negative.  Less than 1 RBC, 1 WBC, and rare bacteria per high-powered

field.  Blood cultures on 12/29/2018 showed no growth after 24 hours. 

Urine C and S pending.



UROLOGIC DIAGNOSTIC IMPRESSION:

1.  Obstructing 5.2 mm proximal to mid ureteral stone with hydronephrosis.

2.  Left renal colic.

3.  Elevated white blood cell count and rising white blood cell count.



PLAN:  As discussed with the hospitalist, Dr. Duran, is to schedule the

patient for a cystoscopy, insertion of left ureteral stent.  The patient is

currently being treated for her GI pathology.







__________________________________________

Krystian Larose MD





DD:  12/30/2018 15:21:32

DT:  12/30/2018 17:11:53

Job # [id number]

ALBAN

## 2018-12-30 NOTE — CARD
--------------- APPROVED REPORT --------------





Date of service: 12/29/2018



EKG Measurement

Heart [id number]

KS [id number]

[id number]

[id number]

[id number]



<Conclusion>

Sinus bradycardia

Otherwise normal ECG

## 2018-12-31 LAB
ALBUMIN SERPL-MCNC: 3 G/DL (ref 3.5–5)
ALBUMIN/GLOB SERPL: 0.9 {RATIO} (ref 1–2.1)
ALT SERPL-CCNC: 13 U/L (ref 9–52)
AST SERPL-CCNC: 26 U/L (ref 14–36)
BASOPHILS # BLD AUTO: 0.1 K/UL (ref 0–0.2)
BASOPHILS NFR BLD: 0.6 % (ref 0–2)
BUN SERPL-MCNC: 11 MG/DL (ref 7–17)
CALCIUM SERPL-MCNC: 9.2 MG/DL (ref 8.4–10.2)
EOSINOPHIL # BLD AUTO: 0.1 K/UL (ref 0–0.7)
EOSINOPHIL NFR BLD: 0.9 % (ref 0–4)
ERYTHROCYTE [DISTWIDTH] IN BLOOD BY AUTOMATED COUNT: 19 % (ref 11.5–14.5)
FOLATE SERPL-MCNC: 7.5 NG/ML
GFR NON-AFRICAN AMERICAN: 50
HGB BLD-MCNC: 7.5 G/DL (ref 12–16)
LYMPHOCYTES # BLD AUTO: 1.8 K/UL (ref 1–4.3)
LYMPHOCYTES NFR BLD AUTO: 12.5 % (ref 20–40)
MCH RBC QN AUTO: 22.3 PG (ref 27–31)
MCHC RBC AUTO-ENTMCNC: 31.1 G/DL (ref 33–37)
MCV RBC AUTO: 71.9 FL (ref 81–99)
MONOCYTES # BLD: 1.1 K/UL (ref 0–0.8)
MONOCYTES NFR BLD: 7.7 % (ref 0–10)
NEUTROPHILS # BLD: 11.1 K/UL (ref 1.8–7)
NEUTROPHILS NFR BLD AUTO: 78.3 % (ref 50–75)
NRBC BLD AUTO-RTO: 0 % (ref 0–0)
PLATELET # BLD: 444 K/UL (ref 130–400)
PMV BLD AUTO: 7.5 FL (ref 7.2–11.7)
RBC # BLD AUTO: 3.36 MIL/UL (ref 3.8–5.2)
WBC # BLD AUTO: 14.2 K/UL (ref 4.8–10.8)

## 2018-12-31 PROCEDURE — 0T778DZ DILATION OF LEFT URETER WITH INTRALUMINAL DEVICE, VIA NATURAL OR ARTIFICIAL OPENING ENDOSCOPIC: ICD-10-PCS

## 2018-12-31 NOTE — OP
PROCEDURE DATE:  12/31/2018



UROLOGIC OPERATIVE REPORT



PROCEDURE:  Patient was placed on the cystoscopy table in a dorsal

lithotomy position, prepped and draped in usual sterile fashion with

Betadine solution.  Under laryngeal mask anesthesia, KUB was obtained which

showed a mid to proximal left ureteral calcification.  Next, using a Storz

22-Chinese cystoscope with 30-degree lens and normal saline as irrigating

solution throughout entire procedure, the cystoscope was inserted into the

urethra and into the bladder.  The bladder was examined in all 4 quadrants.

There were no foreign bodies or suspicious lesions seen in the bladder. 

Both ureteral orifices were normal location, normal configuration on the

trigone with clear efflux from the right ureteral orifice and decreased

efflux from the left ureteral orifice.  Next, a Microvasive 35 thousandth

inch 150 cm Sensor wire was placed into the left ureteral orifice and

passed up to the area with at least a single coil in the left renal pelvis.

Next, a Microvasive [employer] scientific Percuflex VL multi-length ureteral

stent was passed over the Sensor wire into the area of the left ureteral

orifice and up to the area of the left renal pelvis under direct vision and

fluoroscopic control beyond the level of the stone, and actually, the stone

appeared to be pushed up into the lower pole calyx of the kidney.  With at

least single coil seen in the left renal pelvis and the end of the stent

seen in the bladder, the sensor wire was removed and two coils were now

seen in the bladder.  The cystoscope was removed after emptying the

bladder of all irrigation fluids.  The patient tolerated the procedure well

with less than 5 mL of blood loss and was brought to the recovery area in

satisfactory condition.





__________________________________________

Krystian Larose MD





DD:  12/31/2018 10:25:40

DT:  12/31/2018 13:17:59

Job # [id number]

ALBAN

## 2018-12-31 NOTE — RAD
Date of service: 



12/31/2018



PROCEDURE:  CHEST RADIOGRAPH, 1 VIEW



HISTORY:

vomiting



COMPARISON:

12/29/2018



FINDINGS:



LUNGS:

Clear.



PLEURA:

No pneumothorax or pleural fluid seen.



CARDIOVASCULAR:

 No aortic atherosclerotic calcification present. Normal.



OSSEOUS STRUCTURES:

No significant abnormalities.



VISUALIZED UPPER ABDOMEN:

Normal.



OTHER FINDINGS:

None. 



IMPRESSION:

No active disease. No acute/significant interval changes.

## 2018-12-31 NOTE — CP.PCM.PN
<Jose A Aldrich - Last Filed: 12/31/18 14:37>





Subjective





- Date & Time of Evaluation


Date of Evaluation: 12/31/18


Time of Evaluation: 09:20





- Subjective


Subjective: 





64 y/o F was seen and examined by bedside. Pt reports feeling OK, denies nausea,

vomiting, L flank pain or urinary difficulties. Pt had stent placed today, as pe

r urologist pt had a vomiting episode while in the OR. Pt afebrile, tolerating 

PO with NO acute events overnight. 








Objective





- Vital Signs/Intake and Output


Vital Signs (last 24 hours): 


                                        











Temp Pulse Resp BP Pulse Ox


 


 98.1 F   78   20   127/74   94 L


 


 12/31/18 12:08  12/31/18 12:08  12/31/18 12:08  12/31/18 12:08  12/31/18 12:08








Intake and Output: 


                                        











 12/31/18 12/31/18





 06:59 18:59


 


Intake Total  1000


 


Output Total  700


 


Balance  300














- Medications


Medications: 


                               Current Medications





Acetaminophen (Tylenol 325mg Tab)  650 mg PO Q6 PRN


   PRN Reason: Pain, Mild (1-3)


Cyanocobalamin (Vitamin B12 1000 Mcg/Ml Inj)  1,000 mcg IM DAILY [facility]


   Last Admin: 12/31/18 12:23 Dose:  1,000 mcg


Enoxaparin Sodium (Lovenox)  40 mg SC DAILY [facility]; Protocol


   Last Admin: 12/30/18 11:46 Dose:  40 mg


Ferrous Sulfate (Feosol)  325 mg PO BID [facility]


   Last Admin: 12/31/18 08:48 Dose:  Not Given


Ceftriaxone Sodium 1 gm/ (Sodium Chloride)  100 mls @ 100 mls/hr IVPB DAILY [facility];

Protocol


   Last Admin: 12/31/18 08:54 Dose:  100 mls/hr


Sodium Chloride (Sodium Chloride 0.9%)  500 mls @ 999 mls/hr IV .Q31M [facility]


   Last Admin: 12/29/18 21:35 Dose:  999 mls/hr


Iron Sucrose 100 mg/ Sodium (Chloride)  105 mls @ 105 mls/hr IVPB DAILY [facility]


   Last Admin: 12/31/18 12:19 Dose:  105 mls/hr


Ibuprofen (Motrin Tab)  400 mg PO Q6 PRN


   PRN Reason: Fever >100.4 F


   Last Admin: 12/29/18 17:08 Dose:  400 mg


Ketorolac Tromethamine (Toradol)  30 mg IVP Q6 PRN


   PRN Reason: Pain, severe (8-10)


   Last Admin: 12/30/18 21:14 Dose:  30 mg


Ketorolac Tromethamine (Toradol)  15 mg IVP Q6 PRN


   PRN Reason: Pain, moderate (4-7)


Metronidazole (Flagyl)  500 mg PO Q8 [facility]; Protocol


   Last Admin: 12/31/18 08:48 Dose:  Not Given


Ondansetron HCl (Zofran Inj)  4 mg IVP Q6 PRN


   PRN Reason: Nausea/Vomiting


Tamsulosin HCl (Flomax)  0.4 mg PO DAILY [facility]


   Last Admin: 12/30/18 08:29 Dose:  0.4 mg











- Labs


Labs: 


                                        





                                 12/31/18 05:17 





                                 12/31/18 05:17 





                                        











PT  11.4 Seconds (9.8-13.1)   12/29/18  10:45    


 


INR  1.0   12/29/18  10:45    


 


APTT  36.3 Seconds (25.6-37.1)   12/29/18  10:45    














- Constitutional


Appears: Well, No Acute Distress





- Head Exam


Head Exam: ATRAUMATIC, NORMAL INSPECTION





- Eye Exam


Eye Exam: EOMI, Normal appearance





- ENT Exam


ENT Exam: Mucous Membranes Moist





- Neck Exam


Neck Exam: Full ROM, Normal Inspection





- Respiratory Exam


Respiratory Exam: NORMAL BREATHING PATTERN.  absent: Rhonchi, Wheezes, 

Respiratory Distress





- Cardiovascular Exam


Cardiovascular Exam: REGULAR RHYTHM, +S1, +S2





- GI/Abdominal Exam


GI & Abdominal Exam: Soft, Normal Bowel Sounds.  absent: Distended, Guarding, 

Rigid, Tenderness, Mass, Rebound





- Extremities Exam


Extremities Exam: Full ROM, Normal Inspection.  absent: Pedal Edema





- Back Exam


Back Exam: absent: CVA tenderness (L), CVA tenderness (R)





- Neurological Exam


Neurological Exam: Alert, Awake, Oriented x3





Assessment and Plan





- Assessment and Plan (Free Text)


Assessment: 





64 y/o F presented to ED complaining of severe L abdominal and suprapubic pain, 

admitted for evaluation and management of nephrolithiasis. 


-CT Abdomen/pelvis: 5.2mm obstructing calculus proximal/mid L ureter with mild L

hydronephrosis; and mild colitis.  





PLAN:


 


Nephrolithiasis


-Afebrile, stable.


-Pain management ordered.


-Zofran PRN for nausea and vomiting. 


-Ceftriaxone IV daily. 


-Flomax daily


-Urology consult, Dr Larose.


-Stent placed today by urology team.  


-As per Urology, ok to discharge if WBC is stable and pt has no emesis vomiting.

Pt ca f/u with him within 1-2 weeks. 





Colitis, mild


-CT Abdomen/pelvis: 5.2mm obstructing calculus proximal/mid L ureter with mild L

hydronephrosis; and mild colitis.  


-Zofran PRN for nausea and vomiting. 


-Monitor symptoms





Iron deficiency anemia


-Asymptomatic


-Likely secondary to gastric bypass or decreased PO intake.


-Feosol 325 BID


-IM Cyanocobalamin and IV Iron sucrose ordered. 





DVT Prophylaxis


-SCDs


-Lovenox 40mg daily





Diet


-Clear liquid, NPO post midnight








<Fatoumata Abdalla - Last Filed: 12/31/18 18:18>





Objective





- Vital Signs/Intake and Output


Vital Signs (last 24 hours): 


                                        











Temp Pulse Resp BP Pulse Ox


 


 100.1 F H  85   20   153/69 H  94 L


 


 12/31/18 17:06  12/31/18 17:06  12/31/18 17:06  12/31/18 17:06  12/31/18 17:06








Intake and Output: 


                                        











 12/31/18 12/31/18





 06:59 18:59


 


Intake Total  1000


 


Output Total  700


 


Balance  300














- Medications


Medications: 


                               Current Medications





Acetaminophen (Tylenol 325mg Tab)  650 mg PO Q6 PRN


   PRN Reason: Pain, Mild (1-3)


Cyanocobalamin (Vitamin B12 1000 Mcg/Ml Inj)  1,000 mcg IM DAILY [facility]


   Last Admin: 12/31/18 12:23 Dose:  1,000 mcg


Enoxaparin Sodium (Lovenox)  40 mg SC DAILY [facility]; Protocol


   Last Admin: 12/30/18 11:46 Dose:  40 mg


Ferrous Sulfate (Feosol)  325 mg PO BID [facility]


   Last Admin: 12/31/18 17:34 Dose:  325 mg


Ceftriaxone Sodium 1 gm/ (Sodium Chloride)  100 mls @ 100 mls/hr IVPB DAILY [facility];

Protocol


   Last Admin: 12/31/18 08:54 Dose:  100 mls/hr


Sodium Chloride (Sodium Chloride 0.9%)  500 mls @ 999 mls/hr IV .Q31M [facility]


   Last Admin: 12/29/18 21:35 Dose:  999 mls/hr


Iron Sucrose 100 mg/ Sodium (Chloride)  105 mls @ 105 mls/hr IVPB DAILY [facility]


   Last Admin: 12/31/18 12:19 Dose:  105 mls/hr


Ibuprofen (Motrin Tab)  400 mg PO Q6 PRN


   PRN Reason: Fever >100.4 F


   Last Admin: 12/29/18 17:08 Dose:  400 mg


Ketorolac Tromethamine (Toradol)  30 mg IVP Q6 PRN


   PRN Reason: Pain, severe (8-10)


   Last Admin: 12/30/18 21:14 Dose:  30 mg


Ketorolac Tromethamine (Toradol)  15 mg IVP Q6 PRN


   PRN Reason: Pain, moderate (4-7)


Metronidazole (Flagyl)  500 mg PO Q8 [facility]; Protocol


   Last Admin: 12/31/18 17:35 Dose:  500 mg


Ondansetron HCl (Zofran Inj)  4 mg IVP Q6 PRN


   PRN Reason: Nausea/Vomiting


Tamsulosin HCl (Flomax)  0.4 mg PO DAILY [facility]


   Last Admin: 12/31/18 17:35 Dose:  0.4 mg











- Labs


Labs: 


                                        





                                 12/31/18 05:17 





                                 12/31/18 05:17 





                                        











PT  11.4 Seconds (9.8-13.1)   12/29/18  10:45    


 


INR  1.0   12/29/18  10:45    


 


APTT  36.3 Seconds (25.6-37.1)   12/29/18  10:45    














Attending/Attestation





- Attestation


I have personally seen and examined this patient.: Yes


I have fully participated in the care of the patient.: Yes


I have reviewed all pertinent clinical information, including history, physical 

exam and plan: Yes

## 2018-12-31 NOTE — RAD
Date of service: 



12/31/2018



PROCEDURE:  Intraoperative Fluoroscopy. 



HISTORY:

CYSTOSCOPY



FINDINGS:

Fluoroscopic assistance was provided for stent placement on the left. 

 Please refer to the operative report from ROMANA Ray. 



 Total fluoroscopic time (continuous mode) utilized during the 

procedure 12 seconds.

## 2019-01-01 VITALS
DIASTOLIC BLOOD PRESSURE: 67 MMHG | SYSTOLIC BLOOD PRESSURE: 133 MMHG | RESPIRATION RATE: 19 BRPM | HEART RATE: 67 BPM | TEMPERATURE: 97.5 F

## 2019-01-01 VITALS — OXYGEN SATURATION: 95 %

## 2019-01-01 LAB
ALBUMIN SERPL-MCNC: 3.1 G/DL (ref 3.5–5)
ALBUMIN/GLOB SERPL: 1 {RATIO} (ref 1–2.1)
ALT SERPL-CCNC: 23 U/L (ref 9–52)
AST SERPL-CCNC: 31 U/L (ref 14–36)
BASOPHILS # BLD AUTO: 0.1 K/UL (ref 0–0.2)
BASOPHILS NFR BLD: 0.8 % (ref 0–2)
BUN SERPL-MCNC: 6 MG/DL (ref 7–17)
CALCIUM SERPL-MCNC: 9.3 MG/DL (ref 8.4–10.2)
EOSINOPHIL # BLD AUTO: 0.1 K/UL (ref 0–0.7)
EOSINOPHIL NFR BLD: 1.4 % (ref 0–4)
ERYTHROCYTE [DISTWIDTH] IN BLOOD BY AUTOMATED COUNT: 19 % (ref 11.5–14.5)
GFR NON-AFRICAN AMERICAN: > 60
HGB BLD-MCNC: 7.8 G/DL (ref 12–16)
LYMPHOCYTES # BLD AUTO: 1.9 K/UL (ref 1–4.3)
LYMPHOCYTES NFR BLD AUTO: 19.1 % (ref 20–40)
MCH RBC QN AUTO: 22.5 PG (ref 27–31)
MCHC RBC AUTO-ENTMCNC: 31.3 G/DL (ref 33–37)
MCV RBC AUTO: 72 FL (ref 81–99)
MONOCYTES # BLD: 0.8 K/UL (ref 0–0.8)
MONOCYTES NFR BLD: 7.6 % (ref 0–10)
NEUTROPHILS # BLD: 7.2 K/UL (ref 1.8–7)
NEUTROPHILS NFR BLD AUTO: 71.1 % (ref 50–75)
NRBC BLD AUTO-RTO: 0.1 % (ref 0–0)
PLATELET # BLD: 457 K/UL (ref 130–400)
PMV BLD AUTO: 7.9 FL (ref 7.2–11.7)
RBC # BLD AUTO: 3.47 MIL/UL (ref 3.8–5.2)
WBC # BLD AUTO: 10.2 K/UL (ref 4.8–10.8)

## 2019-01-01 NOTE — CP.PCM.DIS
<Francy Agrawal - Last Filed: 01/01/19 14:32>





Provider





- Provider


Date of Admission: 


12/29/18 14:26





Attending physician: 


Reagan Tran MD





Consults: 








12/29/18 14:30


Urology Consult Stat 


   Comment: 


   Consulting Provider: Krystian Larose


   Consulting Physician: Krystian Larose


   Reason for Consult: L nephrolithiasis with surrounding fluid and stranding





12/29/18 21:08


Nursing Referral for Palliative Care Routine 


   Comment: 


   Consulting Provider: Yoselin Lopez


   Physician Instructions: 


   Reason For Exam: As per Admission Assessment











Time Spent in preparation of Discharge (in minutes): 30





Diagnosis





- Discharge Diagnosis


(1) Nephrolithiasis


Status: Acute   





(2) Colitis


Status: Acute   





(3) Anemia


Status: Chronic   





Hospital Course





- Lab Results


Lab Results: 


                                  Micro Results





12/29/18 14:09   Blood-Venous   Blood Culture - Preliminary


                            NO GROWTH AFTER 48 HOURS


12/29/18 14:05   Blood-Venous   Blood Culture - Preliminary


                            NO GROWTH AFTER 48 HOURS





                             Most Recent Lab Values











WBC  10.2 K/uL (4.8-10.8)   01/01/19  05:50    


 


RBC  3.47 Mil/uL (3.80-5.20)  L  01/01/19  05:50    


 


Hgb  7.8 g/dL (12.0-16.0)  L  01/01/19  05:50    


 


Hct  25.0 % (34.0-47.0)  L  01/01/19  05:50    


 


MCV  72.0 fl (81.0-99.0)  L  01/01/19  05:50    


 


MCH  22.5 pg (27.0-31.0)  L  01/01/19  05:50    


 


MCHC  31.3 g/dL (33.0-37.0)  L  01/01/19  05:50    


 


RDW  19.0 % (11.5-14.5)  H  01/01/19  05:50    


 


Plt Count  457 K/uL (130-400)  H  01/01/19  05:50    


 


MPV  7.9 fl (7.2-11.7)   01/01/19  05:50    


 


Neut % (Auto)  71.1 % (50.0-75.0)   01/01/19  05:50    


 


Lymph % (Auto)  19.1 % (20.0-40.0)  L  01/01/19  05:50    


 


Mono % (Auto)  7.6 % (0.0-10.0)   01/01/19  05:50    


 


Eos % (Auto)  1.4 % (0.0-4.0)   01/01/19  05:50    


 


Baso % (Auto)  0.8 % (0.0-2.0)   01/01/19  05:50    


 


Neut # (Auto)  7.2 K/uL (1.8-7.0)  H  01/01/19  05:50    


 


Lymph # (Auto)  1.9 K/uL (1.0-4.3)   01/01/19  05:50    


 


Mono # (Auto)  0.8 K/uL (0.0-0.8)   01/01/19  05:50    


 


Eos # (Auto)  0.1 K/uL (0.0-0.7)   01/01/19  05:50    


 


Baso # (Auto)  0.1 K/uL (0.0-0.2)   01/01/19  05:50    


 


Retic Count  1.8 % (0.5-1.5)  H  12/30/18  09:30    


 


PT  11.4 Seconds (9.8-13.1)   12/29/18  10:45    


 


INR  1.0   12/29/18  10:45    


 


APTT  36.3 Seconds (25.6-37.1)   12/29/18  10:45    


 


pO2  30 mm/Hg (30-55)   12/29/18  13:50    


 


VBG pH  7.32  (7.32-7.43)   12/29/18  13:50    


 


VBG pCO2  43 mmHg (40-60)   12/29/18  13:50    


 


VBG HCO3  20.6 mmol/L  12/29/18  13:50    


 


VBG Total CO2  23.5 mmol/L (22-28)   12/29/18  13:50    


 


VBG O2 Sat (Calc)  52.1 % (40-65)   12/29/18  13:50    


 


VBG Base Excess  -3.9 mmol/L (0.0-2.0)  L  12/29/18  13:50    


 


VBG Potassium  3.6 mmol/L (3.6-5.2)   12/29/18  13:50    


 


Sodium  132.0 mmol/L (132-148)   12/29/18  13:50    


 


Chloride  103.0 mmol/L ()   12/29/18  13:50    


 


Glucose  193 mg/dL ()  H  12/29/18  13:50    


 


Lactate  2.0 mmol/L (0.7-2.1)   12/29/18  13:50    


 


FiO2  21.0 %  12/29/18  13:50    


 


Sodium  141 mmol/l (132-148)   01/01/19  05:50    


 


Potassium  3.5 MMOL/L (3.6-5.0)  L  01/01/19  05:50    


 


Chloride  112 mmol/L ()  H  01/01/19  05:50    


 


Carbon Dioxide  25 mmol/L (22-30)   01/01/19  05:50    


 


Anion Gap  8  (10-20)  L  01/01/19  05:50    


 


BUN  6 mg/dl (7-17)  L  01/01/19  05:50    


 


Creatinine  0.6 mg/dl (0.7-1.2)  L  01/01/19  05:50    


 


Est GFR ( Amer)  > 60   01/01/19  05:50    


 


Est GFR (Non-Af Amer)  > 60   01/01/19  05:50    


 


Random Glucose  118 mg/dL ()  H  01/01/19  05:50    


 


Calcium  9.3 mg/dL (8.4-10.2)   01/01/19  05:50    


 


Iron  10 ug/dL ()  L  12/30/18  09:30    


 


TIBC  416 ug/dL (250-450)   12/30/18  09:30    


 


% Saturation  2 % (20-55)  L  12/30/18  09:30    


 


Transferrin  308.38 mg/dL (206-381)   12/30/18  09:30    


 


Ferritin  13.7 ng/Ml (11.1-264.0)   12/30/18  09:30    


 


Total Bilirubin  0.1 mg/dl (0.2-1.3)  L  01/01/19  05:50    


 


AST  31 U/L (14-36)   01/01/19  05:50    


 


ALT  23 U/L (9-52)   01/01/19  05:50    


 


Alkaline Phosphatase  88 U/L ()   01/01/19  05:50    


 


Troponin I  < 0.0120 ng/mL (0.00-0.120)   12/29/18  10:45    


 


Total Protein  6.4 G/DL (6.3-8.2)   01/01/19  05:50    


 


Albumin  3.1 g/dL (3.5-5.0)  L  01/01/19  05:50    


 


Globulin  3.3 gm/dL (2.2-3.9)   01/01/19  05:50    


 


Albumin/Globulin Ratio  1.0  (1.0-2.1)   01/01/19  05:50    


 


Lipase  100 U/L ()   12/29/18  10:45    


 


Vitamin B12  673 pg/mL (239-931)   12/30/18  09:30    


 


Folate  7.5 ng/mL  12/30/18  09:30    


 


Venous Blood Potassium  3.6 mmol/L (3.6-5.2)   12/29/18  13:50    


 


Urine Color  Yellow  (YELLOW)   12/29/18  12:00    


 


Urine Clarity  Clear  (Clear)   12/29/18  12:00    


 


Urine pH  7.0  (5.0-8.0)   12/29/18  12:00    


 


Ur Specific Gravity  1.014  (1.003-1.030)   12/29/18  12:00    


 


Urine Protein  Negative mg/dL (NEGATIVE)   12/29/18  12:00    


 


Urine Glucose (UA)  Neg mg/dL (NEGATIVE)   12/29/18  12:00    


 


Urine Ketones  20 mg/dL (NEGATIVE)   12/29/18  12:00    


 


Urine Blood  Negative  (NEGATIVE)   12/29/18  12:00    


 


Urine Nitrate  Negative  (NEGATIVE)   12/29/18  12:00    


 


Urine Bilirubin  Negative  (NEGATIVE)   12/29/18  12:00    


 


Urine Urobilinogen  0.2-1.0 mg/dL (0.2-1.0)   12/29/18  12:00    


 


Ur Leukocyte Esterase  Neg Chloe/uL (Negative)   12/29/18  12:00    


 


Urine RBC (Auto)  < 1 /hpf (0-3)   12/29/18  12:00    


 


Urine Microscopic WBC  1 /hpf (0-5)   12/29/18  12:00    


 


Ur Squamous Epith Cells  1 /hpf (0-5)   12/29/18  12:00    


 


Urine Bacteria  Rare  (<OCC)   12/29/18  12:00    


 


Stool Occult Blood  Negative  (NEGATIVE)   12/30/18  12:30    


 


C. difficile Ag & Toxin  Negative  (NEGATIVE)   12/30/18  15:37    


 


Blood Type  O POSITIVE   12/30/18  18:05    


 


Antibody Screen  Negative   12/30/18  18:05    


 


Crossmatch  See Detail   12/30/18  18:05    


 


BBK History Checked  Patient has bt   12/30/18  18:05    














- Hospital Course


Hospital Course: 





62 yo F with history DM2 (controlled after gastric bypass surgery), was admitted

due to sepsis secondary to nephrolithiasis. Pt came to ED with complaint of 

severe abdominal pain radiating from L flank to suprapubic area. CT 

Abdomen/pelvis showed 5.2mm obstructing calculus proximal/mid L ureter with mild

L hydronephrosis; and mild colitis. She also had leukocytosis and anemia (likely

chronic due to hx gastric bypass, FOBT neg) on labs. She was started on 

ceftriaxone, flomax, and analgesics, urologist Dr. Larose consulted - taken 

to OR for left ureteral stent placement on 12/31/18 - symptom resolved. 

Leukocytosis resolved as well, and she remained stable throughout the day after 

stent placement. During her stay she was also given IV venofer. 





Seen and eval this morning at bedside; reports uneventful overnight. Tolerated 

PO diet, able to void, pain free. 


Stable for d/c today with prescriptions for flomax, ciprofloxacin x 5 days, and 

metronidazole x 5 days. Encouraged to continue taking OTC iron supplements.


Encouraged follow up with PMD and urologist Dr. Larose.


Pt verbalized understanding and agreement.





Discharge Exam





- Head Exam


Head Exam: ATRAUMATIC, NORMAL INSPECTION





- Eye Exam


Eye Exam: Normal appearance





- ENT Exam


ENT Exam: Mucous Membranes Moist





- Neck Exam


Neck exam: Full Rom





- Respiratory Exam


Respiratory Exam: Clear to PA & Lateral, NORMAL BREATHING PATTERN





- Cardiovascular Exam


Cardiovascular Exam: REGULAR RHYTHM, +S1, +S2





- GI/Abdominal Exam


GI & Abdominal Exam: Normal Bowel Sounds, Soft.  absent: Tenderness





- Extremities Exam


Extremities exam: normal inspection





- Back Exam


Back exam: NORMAL INSPECTION.  absent: CVA tenderness (L), CVA tenderness (R)





- Neurological Exam


Neurological exam: Alert, Oriented x3





- Psychiatric Exam


Psychiatric exam: Normal Mood





- Skin


Skin Exam: Dry, Warm





Discharge Plan





- Discharge Medications


Prescriptions: 


Ciprofloxacin [Cipro] 500 mg PO Q12 #10 tab


metroNIDAZOLE [Flagyl] 500 mg PO Q8 #15 tab


Tamsulosin [Flomax] 0.4 mg PO DAILY #30 cap





- Follow Up Plan


Condition: STABLE


Disposition: HOME/ ROUTINE


Instructions:  Cystoscopy (DC), Ureteral Stent (DC)


Additional Instructions: 


Please take antibiotics as prescribed.


Please follow up with Dr. Larose within 1-2 weeks.


Follow up with your PMD in 1 week.


May take tylenol for pain control. 


Return to ED if you experience recurrence of symptoms or change in condition.


Referrals: 


Krystian Larose MD [Staff Provider] - 





<Fatoumata Abdalla - Last Filed: 01/01/19 15:21>





Provider





- Provider


Date of Admission: 


12/29/18 14:26





Attending physician: 


Reagan Tran MD





Consults: 








12/29/18 14:30


Urology Consult Stat 


   Comment: 


   Consulting Provider: Krystian Larose


   Consulting Physician: Krystian Larose


   Reason for Consult: L nephrolithiasis with surrounding fluid and stranding





12/29/18 21:08


Nursing Referral for Palliative Care Routine 


   Comment: 


   Consulting Provider: Yoselin Lopez


   Physician Instructions: 


   Reason For Exam: As per Admission Assessment














Hospital Course





- Lab Results


Lab Results: 


                                  Micro Results





12/29/18 14:09   Blood-Venous   Blood Culture - Preliminary


                            NO GROWTH AFTER 3 DAYS


12/29/18 14:05   Blood-Venous   Blood Culture - Preliminary


                            NO GROWTH AFTER 3 DAYS





                             Most Recent Lab Values











WBC  10.2 K/uL (4.8-10.8)   01/01/19  05:50    


 


RBC  3.47 Mil/uL (3.80-5.20)  L  01/01/19  05:50    


 


Hgb  7.8 g/dL (12.0-16.0)  L  01/01/19  05:50    


 


Hct  25.0 % (34.0-47.0)  L  01/01/19  05:50    


 


MCV  72.0 fl (81.0-99.0)  L  01/01/19  05:50    


 


MCH  22.5 pg (27.0-31.0)  L  01/01/19  05:50    


 


MCHC  31.3 g/dL (33.0-37.0)  L  01/01/19  05:50    


 


RDW  19.0 % (11.5-14.5)  H  01/01/19  05:50    


 


Plt Count  457 K/uL (130-400)  H  01/01/19  05:50    


 


MPV  7.9 fl (7.2-11.7)   01/01/19  05:50    


 


Neut % (Auto)  71.1 % (50.0-75.0)   01/01/19  05:50    


 


Lymph % (Auto)  19.1 % (20.0-40.0)  L  01/01/19  05:50    


 


Mono % (Auto)  7.6 % (0.0-10.0)   01/01/19  05:50    


 


Eos % (Auto)  1.4 % (0.0-4.0)   01/01/19  05:50    


 


Baso % (Auto)  0.8 % (0.0-2.0)   01/01/19  05:50    


 


Neut # (Auto)  7.2 K/uL (1.8-7.0)  H  01/01/19  05:50    


 


Lymph # (Auto)  1.9 K/uL (1.0-4.3)   01/01/19  05:50    


 


Mono # (Auto)  0.8 K/uL (0.0-0.8)   01/01/19  05:50    


 


Eos # (Auto)  0.1 K/uL (0.0-0.7)   01/01/19  05:50    


 


Baso # (Auto)  0.1 K/uL (0.0-0.2)   01/01/19  05:50    


 


Retic Count  1.8 % (0.5-1.5)  H  12/30/18  09:30    


 


PT  11.4 Seconds (9.8-13.1)   12/29/18  10:45    


 


INR  1.0   12/29/18  10:45    


 


APTT  36.3 Seconds (25.6-37.1)   12/29/18  10:45    


 


pO2  30 mm/Hg (30-55)   12/29/18  13:50    


 


VBG pH  7.32  (7.32-7.43)   12/29/18  13:50    


 


VBG pCO2  43 mmHg (40-60)   12/29/18  13:50    


 


VBG HCO3  20.6 mmol/L  12/29/18  13:50    


 


VBG Total CO2  23.5 mmol/L (22-28)   12/29/18  13:50    


 


VBG O2 Sat (Calc)  52.1 % (40-65)   12/29/18  13:50    


 


VBG Base Excess  -3.9 mmol/L (0.0-2.0)  L  12/29/18  13:50    


 


VBG Potassium  3.6 mmol/L (3.6-5.2)   12/29/18  13:50    


 


Sodium  132.0 mmol/L (132-148)   12/29/18  13:50    


 


Chloride  103.0 mmol/L ()   12/29/18  13:50    


 


Glucose  193 mg/dL ()  H  12/29/18  13:50    


 


Lactate  2.0 mmol/L (0.7-2.1)   12/29/18  13:50    


 


FiO2  21.0 %  12/29/18  13:50    


 


Sodium  141 mmol/l (132-148)   01/01/19  05:50    


 


Potassium  3.5 MMOL/L (3.6-5.0)  L  01/01/19  05:50    


 


Chloride  112 mmol/L ()  H  01/01/19  05:50    


 


Carbon Dioxide  25 mmol/L (22-30)   01/01/19  05:50    


 


Anion Gap  8  (10-20)  L  01/01/19  05:50    


 


BUN  6 mg/dl (7-17)  L  01/01/19  05:50    


 


Creatinine  0.6 mg/dl (0.7-1.2)  L  01/01/19  05:50    


 


Est GFR ( Amer)  > 60   01/01/19  05:50    


 


Est GFR (Non-Af Amer)  > 60   01/01/19  05:50    


 


Random Glucose  118 mg/dL ()  H  01/01/19  05:50    


 


Calcium  9.3 mg/dL (8.4-10.2)   01/01/19  05:50    


 


Iron  10 ug/dL ()  L  12/30/18  09:30    


 


TIBC  416 ug/dL (250-450)   12/30/18  09:30    


 


% Saturation  2 % (20-55)  L  12/30/18  09:30    


 


Transferrin  308.38 mg/dL (206-381)   12/30/18  09:30    


 


Ferritin  13.7 ng/Ml (11.1-264.0)   12/30/18  09:30    


 


Total Bilirubin  0.1 mg/dl (0.2-1.3)  L  01/01/19  05:50    


 


AST  31 U/L (14-36)   01/01/19  05:50    


 


ALT  23 U/L (9-52)   01/01/19  05:50    


 


Alkaline Phosphatase  88 U/L ()   01/01/19  05:50    


 


Troponin I  < 0.0120 ng/mL (0.00-0.120)   12/29/18  10:45    


 


Total Protein  6.4 G/DL (6.3-8.2)   01/01/19  05:50    


 


Albumin  3.1 g/dL (3.5-5.0)  L  01/01/19  05:50    


 


Globulin  3.3 gm/dL (2.2-3.9)   01/01/19  05:50    


 


Albumin/Globulin Ratio  1.0  (1.0-2.1)   01/01/19  05:50    


 


Lipase  100 U/L ()   12/29/18  10:45    


 


Vitamin B12  673 pg/mL (239-931)   12/30/18  09:30    


 


Folate  7.5 ng/mL  12/30/18  09:30    


 


Venous Blood Potassium  3.6 mmol/L (3.6-5.2)   12/29/18  13:50    


 


Urine Color  Yellow  (YELLOW)   12/29/18  12:00    


 


Urine Clarity  Clear  (Clear)   12/29/18  12:00    


 


Urine pH  7.0  (5.0-8.0)   12/29/18  12:00    


 


Ur Specific Gravity  1.014  (1.003-1.030)   12/29/18  12:00    


 


Urine Protein  Negative mg/dL (NEGATIVE)   12/29/18  12:00    


 


Urine Glucose (UA)  Neg mg/dL (NEGATIVE)   12/29/18  12:00    


 


Urine Ketones  20 mg/dL (NEGATIVE)   12/29/18  12:00    


 


Urine Blood  Negative  (NEGATIVE)   12/29/18  12:00    


 


Urine Nitrate  Negative  (NEGATIVE)   12/29/18  12:00    


 


Urine Bilirubin  Negative  (NEGATIVE)   12/29/18  12:00    


 


Urine Urobilinogen  0.2-1.0 mg/dL (0.2-1.0)   12/29/18  12:00    


 


Ur Leukocyte Esterase  Neg Chloe/uL (Negative)   12/29/18  12:00    


 


Urine RBC (Auto)  < 1 /hpf (0-3)   12/29/18  12:00    


 


Urine Microscopic WBC  1 /hpf (0-5)   12/29/18  12:00    


 


Ur Squamous Epith Cells  1 /hpf (0-5)   12/29/18  12:00    


 


Urine Bacteria  Rare  (<OCC)   12/29/18  12:00    


 


Stool Occult Blood  Negative  (NEGATIVE)   12/30/18  12:30    


 


C. difficile Ag & Toxin  Negative  (NEGATIVE)   12/30/18  15:37    


 


Blood Type  O POSITIVE   12/30/18  18:05    


 


Antibody Screen  Negative   12/30/18  18:05    


 


Crossmatch  See Detail   12/30/18  18:05    


 


BBK History Checked  Patient has bt   12/30/18  18:05    














Attending/Attestation





- Attestation


I have personally seen and examined this patient.: Yes


I have fully participated in the care of the patient.: Yes


I have reviewed all pertinent clinical information, including history, physical 

exam and plan: Yes


Notes (Text): 





1. Nephrolithiasis with mild Hydronephrosis s/p  Ureteral Stent placement


- Stent placed 


- pt to ff up with Dr Larose


- cont Floamx


- PO hydration








2. UTI


- received IV ceftriaxone


- will d/c on PO Cipro





3. Colitis seen on CT scan


- pt has no fever, no diarrhea 


- cont Flagyl and Cipro 





4. Anemia , chronic likely due to poor absorption due to Gastric Bypass


- received IV Venofer , B12 hots


- cont Multivitamins

## 2019-01-02 NOTE — PQF
PROVIDER RESPONSE TEXT:



Sepsis due to UTI ( POA)



REVIEWER QUERY TEXT:



Documentation Clarification



Your help is requested in clarifying the following clinical documentation. Discharge Summary document
ed "pt was admitted due to sepsis." If you can please further specify in the medical record and disch
arge summary if diagnosis of sepsis was ruled in or out.



The patient's Clinical Indicators include:

xxx





Query created by: Deja Arzola on 1/2/2019 4:05 PM





Electronically signed by:  Fatoumata Abdalla MD 1/2/2019 4:10 PM

## 2019-05-21 ENCOUNTER — HOSPITAL ENCOUNTER (EMERGENCY)
Dept: HOSPITAL 31 - C.ER | Age: 64
Discharge: HOME | End: 2019-05-21
Payer: MEDICARE

## 2019-05-21 VITALS
TEMPERATURE: 97.9 F | DIASTOLIC BLOOD PRESSURE: 72 MMHG | HEART RATE: 69 BPM | SYSTOLIC BLOOD PRESSURE: 144 MMHG | RESPIRATION RATE: 19 BRPM | OXYGEN SATURATION: 100 %

## 2019-05-21 VITALS — BODY MASS INDEX: 26.4 KG/M2

## 2019-05-21 DIAGNOSIS — Z98.890: ICD-10-CM

## 2019-05-21 DIAGNOSIS — Z98.84: ICD-10-CM

## 2019-05-21 DIAGNOSIS — N20.0: ICD-10-CM

## 2019-05-21 DIAGNOSIS — I10: ICD-10-CM

## 2019-05-21 DIAGNOSIS — R55: Primary | ICD-10-CM

## 2019-05-21 LAB
ALBUMIN SERPL-MCNC: 4.3 {NULL, G/DL} (ref 3.5–5)
ALBUMIN/GLOB SERPL: 1.5 {NULL, NULL} (ref 1–2.1)
ALT SERPL-CCNC: 16 {NULL, U/L} (ref 9–52)
AST SERPL-CCNC: 22 {NULL, U/L} (ref 14–36)
BASOPHILS # BLD AUTO: 0.1 {NULL, K/UL} (ref 0–0.2)
BASOPHILS NFR BLD: 0.9 {NULL, %} (ref 0–2)
BILIRUB UR-MCNC: NEGATIVE {NULL, NULL}
BNP SERPL-MCNC: 98.1 {NULL, PG/ML} (ref 0–900)
BUN SERPL-MCNC: 13 {NULL, MG/DL} (ref 7–17)
CALCIUM SERPL-MCNC: 9.2 {NULL, MG/DL} (ref 8.6–10.4)
EOSINOPHIL # BLD AUTO: 0.1 {NULL, K/UL} (ref 0–0.7)
EOSINOPHIL NFR BLD: 2.3 {NULL, %} (ref 0–4)
ERYTHROCYTE [DISTWIDTH] IN BLOOD BY AUTOMATED COUNT: 18.1 {NULL, %} (ref 11.5–14.5)
GFR NON-AFRICAN AMERICAN: > 60 {NULL, NULL}
GLUCOSE UR STRIP-MCNC: NORMAL {NULL, MG/DL}
HGB BLD-MCNC: 9.4 {NULL, G/DL} (ref 11–16)
LEUKOCYTE ESTERASE UR-ACNC: (no result) {NULL, LEU/UL}
LYMPHOCYTES # BLD AUTO: 3 {NULL, K/UL} (ref 1–4.3)
LYMPHOCYTES NFR BLD AUTO: 49.2 {NULL, %} (ref 20–40)
MCH RBC QN AUTO: 25.9 {NULL, PG} (ref 27–31)
MCHC RBC AUTO-ENTMCNC: 32.8 {NULL, G/DL} (ref 33–37)
MCV RBC AUTO: 78.9 {NULL, FL} (ref 81–99)
MONOCYTES # BLD: 0.4 {NULL, K/UL} (ref 0–0.8)
MONOCYTES NFR BLD: 7.2 {NULL, %} (ref 0–10)
NEUTROPHILS # BLD: 2.5 {NULL, K/UL} (ref 1.8–7)
NEUTROPHILS NFR BLD AUTO: 40.4 {NULL, %} (ref 50–75)
NRBC BLD AUTO-RTO: 0.1 {NULL, %} (ref 0–2)
PH UR STRIP: 5 {NULL, NULL} (ref 5–8)
PLATELET # BLD: 553 {NULL, K/UL} (ref 130–400)
PMV BLD AUTO: 7.3 {NULL, FL} (ref 7.2–11.7)
PROT UR STRIP-MCNC: NEGATIVE {NULL, MG/DL}
RBC # BLD AUTO: 3.62 {NULL, MIL/UL} (ref 3.8–5.2)
RBC # UR STRIP: NEGATIVE {NULL, NULL}
SP GR UR STRIP: 1.01 {NULL, NULL} (ref 1–1.03)
SQUAMOUS EPITHIAL: 1 {NULL, /HPF} (ref 0–5)
UROBILINOGEN UR-MCNC: NORMAL {NULL, MG/DL} (ref 0.2–1)
WBC # BLD AUTO: 6.1 {NULL, K/UL} (ref 4.8–10.8)

## 2019-05-21 PROCEDURE — 87086 URINE CULTURE/COLONY COUNT: CPT

## 2019-05-21 PROCEDURE — 80053 COMPREHEN METABOLIC PANEL: CPT

## 2019-05-21 PROCEDURE — 93005 ELECTROCARDIOGRAM TRACING: CPT

## 2019-05-21 PROCEDURE — 85025 COMPLETE CBC W/AUTO DIFF WBC: CPT

## 2019-05-21 PROCEDURE — 71045 X-RAY EXAM CHEST 1 VIEW: CPT

## 2019-05-21 PROCEDURE — 83880 ASSAY OF NATRIURETIC PEPTIDE: CPT

## 2019-05-21 PROCEDURE — 82948 REAGENT STRIP/BLOOD GLUCOSE: CPT

## 2019-05-21 PROCEDURE — 84484 ASSAY OF TROPONIN QUANT: CPT

## 2019-05-21 PROCEDURE — 74018 RADEX ABDOMEN 1 VIEW: CPT

## 2019-05-21 PROCEDURE — 99285 EMERGENCY DEPT VISIT HI MDM: CPT

## 2019-05-21 PROCEDURE — 81001 URINALYSIS AUTO W/SCOPE: CPT

## 2019-05-24 ENCOUNTER — HOSPITAL ENCOUNTER (OUTPATIENT)
Dept: HOSPITAL 31 - C.RADIC | Age: 64
End: 2019-05-24
Payer: MEDICARE